# Patient Record
Sex: FEMALE | Race: WHITE
[De-identification: names, ages, dates, MRNs, and addresses within clinical notes are randomized per-mention and may not be internally consistent; named-entity substitution may affect disease eponyms.]

---

## 2021-08-30 ENCOUNTER — HOSPITAL ENCOUNTER (OUTPATIENT)
Dept: HOSPITAL 61 - RAD | Age: 41
End: 2021-08-30
Attending: PHYSICIAN ASSISTANT
Payer: MEDICAID

## 2021-08-30 DIAGNOSIS — M54.2: ICD-10-CM

## 2021-08-30 DIAGNOSIS — M54.42: Primary | ICD-10-CM

## 2021-08-30 DIAGNOSIS — R20.2: ICD-10-CM

## 2021-08-30 PROCEDURE — 72050 X-RAY EXAM NECK SPINE 4/5VWS: CPT

## 2021-08-30 PROCEDURE — 72110 X-RAY EXAM L-2 SPINE 4/>VWS: CPT

## 2021-08-31 NOTE — RAD
EXAM: AP, lateral and lumbosacral spot views with bilateral oblique views of the lumbar spine



DATE: 8/30/2021 3:23 PM



INDICATION: Reason: ACUTE RIGHT SIDED LOW BACK PAIN W LEFT SIDED SCIATICA / Spl. Instructions:  / His
tory: 



COMPARISON: No Prior



FINDINGS:

5 nonrib-bearing lumbar-type vertebral bodies. Vertebral body heights are preserved. Disc heights are
 preserved. No spondylolisthesis. IUD in tubal ligation clips are seen in the pelvis. No acute fractu
re. 



Moderate colonic stool content is seen.



IMPRESSION:

1.  Negative acute fracture or subluxation.



Electronically signed by: Floyd Ventura MD (8/31/2021 8:46 AM) [id number]

## 2021-08-31 NOTE — RAD
EXAM: AP, lateral, bilateral oblique and open-mouth odontoid views of the cervical spine



DATE: 8/30/2021 3:23 PM



CLINICAL HISTORY: Reason: ACUTE RT-SIDED LOW BACK PAIN W/LT-SIDED SCIATICA. NECK PAIN. / Spl. Instruc
tions: PARESTHESIA RIGHT ARM. / History: 



COMPARISON: None available.



FINDINGS: 

On the lateral view, the cervical spine is imaged from the skull base through C6.



Vertebral body heights are preserved. Intervertebral disc heights are preserved. 



Straightening of the normal cervical lordosis. No spondylolisthesis. 



Slightly suboptimal oblique views. Within these constraints the oblique views do not demonstrate bony
 foraminal narrowing.



Normal predental space. No significant prevertebral soft tissue swelling. 



IMPRESSION:

1.  Negative acute fracture or subluxation.



Electronically signed by: Floyd Ventura MD (8/31/2021 8:48 AM) [id number]

## 2021-11-15 ENCOUNTER — HOSPITAL ENCOUNTER (OUTPATIENT)
Dept: HOSPITAL 61 - KCIC MRI | Age: 41
End: 2021-11-15
Attending: FAMILY MEDICINE
Payer: MEDICAID

## 2021-11-15 DIAGNOSIS — G43.909: Primary | ICD-10-CM

## 2021-11-15 DIAGNOSIS — R20.2: ICD-10-CM

## 2021-11-15 DIAGNOSIS — R42: ICD-10-CM

## 2021-11-15 PROCEDURE — 70553 MRI BRAIN STEM W/O & W/DYE: CPT

## 2021-11-15 NOTE — [facility]
EXAM: Brain MRI with and without contrast.



HISTORY: Migraine. Disequilibrium. Paresthesia.



TECHNIQUE: Multiplanar, multisequence magnetic resonance imaging of the brain was performed prior to 
and following the administration of intravenous contrast.



COMPARISON: None.



FINDINGS: There is no restricted diffusion to suggest acute or subacute infarction. There is no mass 
effect or midline shift. There is no hydrocephalus. There is a focus of T2 such FLAIR hyperintensity 
along the anterior aspect of the right external capsule. The orbits are unremarkable. The paranasal s
inuses and mastoid air cells are unremarkable. There are normal flow voids within the cerebral vessel
s. There is no suspicious calvarial lesion. There is no hemorrhage. There is no suspicious enhancing 
lesion.



IMPRESSION: 

1. No acute intracranial finding.

2. Single focus of signal change within the right external capsule, a nonspecific finding which can b
e seen with chronic small vessel disease and chronic migraine headaches. Despite the patient age, the
 imaging appearance does not favor demyelinating disease.



Electronically signed by: Inés Ramirez MD (11/15/2021 1:10 PM) [id number]